# Patient Record
Sex: MALE | Race: WHITE
[De-identification: names, ages, dates, MRNs, and addresses within clinical notes are randomized per-mention and may not be internally consistent; named-entity substitution may affect disease eponyms.]

---

## 2021-10-01 ENCOUNTER — HOSPITAL ENCOUNTER (EMERGENCY)
Dept: HOSPITAL 63 - ER | Age: 21
LOS: 1 days | Discharge: HOME | End: 2021-10-02
Payer: COMMERCIAL

## 2021-10-01 VITALS — BODY MASS INDEX: 20.06 KG/M2 | HEIGHT: 71 IN | WEIGHT: 143.3 LBS

## 2021-10-01 DIAGNOSIS — J45.909: ICD-10-CM

## 2021-10-01 DIAGNOSIS — R07.89: Primary | ICD-10-CM

## 2021-10-01 PROCEDURE — 71045 X-RAY EXAM CHEST 1 VIEW: CPT

## 2021-10-01 PROCEDURE — 94640 AIRWAY INHALATION TREATMENT: CPT

## 2021-10-01 PROCEDURE — 93005 ELECTROCARDIOGRAM TRACING: CPT

## 2021-10-02 VITALS — DIASTOLIC BLOOD PRESSURE: 67 MMHG | SYSTOLIC BLOOD PRESSURE: 134 MMHG

## 2021-10-02 NOTE — PHYS DOC
Past History


Past Medical History:  Asthma


Past Surgical History:  No Surgical History





Adult General


Chief Complaint


Chief Complaint:  ASTHMA





HPI


HPI





Patient is a 21-year-old male presenting for left-sided chest pain.  Onset was 

several days ago.  Nothing known makes better or worse.  Pain described as left-

sided over left rib cage and focal with no radiation.  Timing of symptoms has 

been intermittent since onset.  He has no history of cardiac disease or passing 

out with activity, no family history of early cardiac disease.  Denies tobacco 

alcohol or illicit drug use.  Does admit recent bout with COVID-19.  Also has 

history of asthma, states it is usually well controlled with albuterol inhaler 

but did not significantly improve when he took a albuterol treatment at home 

prompting him to come in for evaluation





Review of Systems


Review of Systems


Fourteen body systems of review of systems have been reviewed. See HPI for 

pertinent positives and negative responses, other wise all other systems are 

negative, non-pertinent or non-contributory





Physical Exam


Physical Exam


Constitutional: Well developed, well nourished, no acute distress, non-toxic 

appearance. 


HENT: Normocephalic, atraumatic, bilateral external ears normal, oropharynx 

moist, no oral exudates, nose normal. 


Eyes: PERRLA, EOMI, conjunctiva normal, no discharge.  


Neck: Normal range of motion, no tenderness, supple, no stridor.  


Cardiovascular: Heart rate regular, sinus rhythm, no murmurs rubs or gallops


Lungs & Thorax:  Bilateral breath sounds clear to auscultation 


Abdomen: Bowel sounds normal, soft, no tenderness, no masses, no pulsatile 

masses.  Nonsurgical abdomen, no peritoneal signs


Skin: Warm, dry, no erythema, no rash.  


Back: No tenderness, no CVA tenderness.  


Extremities: No tenderness, no cyanosis, no clubbing, ROM intact, no edema.  


Neurologic: Alert and oriented X 3, grossly normal motor & sensory function, no 

focal deficits noted. 


Psychologic: Affect normal, judgement normal, mood normal.





Current Patient Data


Vital Signs





                                   Vital Signs








  Date Time  Temp Pulse Resp B/P (MAP) Pulse Ox O2 Delivery O2 Flow Rate FiO2


 


10/2/21 00:00 98.3 80 18 132/82 (99) 99 Room Air  











EKG


EKG


EKG ordered and interpreted by myself at 00 16 hours as sinus rhythm at 78 bpm, 

unremarkable intervals, right axis deviation, no obvious ischemic findings, no 

STEMI





Radiology/Procedures


Radiology/Procedures





EXAM: AP View of the chest





DATE: 10/2/2021 12:26 AM





INDICATION: Reason: shob / Spl. Instructions:  / History: 





COMPARISON: No Prior





FINDINGS:








The heart is not enlarged.





Mediastinal and hilar contours are normal.





No focal parenchymal airspace opacity.





No pleural effusion or pneumothorax.











IMPRESSION:


1.  No radiographic evidence for acute cardiopulmonary process.





Electronically signed by: Kevin Hallman MD (10/2/2021 1:03 AM) [facility]





Heart Score


C/O Chest Pain:  No


HEART Score for Chest Pain:  








HEART Score for Chest Pain Response (Comments) Value


 


History Slighlty/Non-Suspicious 0


 


ECG Normal 0


 


Age < 45 0


 


Risk Factors No Risk Factors 0


 


Total  0








Risk Factors:


Risk Factors:  DM, Current or recent (<one month) smoker, HTN, HLP, family 

history of CAD, obesity.


Risk Scores:


Risk Factors:  DM, Current or recent (<one month) smoker, HTN, HLP, family 

history of CAD, obesity.





Course & Med Decision Making


Course & Med Decision Making


Pertinent Labs and Imaging studies reviewed. (See chart for details)





[]





Dragon Disclaimer


Dragon Disclaimer


This electronic medical record was generated, in whole or in part, using a voice

 recognition dictation system.





PERC Rule for PE











PERC Rule for PE Response (Comments) Value


 


Age > 50: No 0


 


HR > 100: No 0


 


Sa02 on room air <95%: No 0


 


Unilateral leg swelling: No 0


 


Hemoptysis: No 0


 


Recent surgery or trauma: No 0


 


Prior PE or DVT: No 0


 


Hormone use: No 0


 


Total  0











Departure


Departure:


Impression:  


   Primary Impression:  


   Chest pain, unspecified


Disposition:  01 HOME / SELF CARE / HOMELESS


Condition:  STABLE





Additional Instructions:  


You were seen for chest pain.  Your workup did not show any acute abnormalities 

today, but does not indicate that you do not have underlying cardiovascular 

disease. You do need to follow up with your primary doctor and potentially a 

cardiologist for further evaluation and treatment. You should return to the ED 

if you develop worsening chest pain, shortness of breath, fever, abnormal 

sweating, leg swelling, or any other new or concerning symptoms.











ASHLEIGH LATHAM DO                  Oct 2, 2021 00:17

## 2021-10-02 NOTE — EKG
[facility] [address], [place] [address]

Test Date:    2021-10-02               Test Time:    00:09:30

Pat Name:     TON BROWN     Department:   

Patient ID:   [id number]           Room:          

Gender:       M                        Technician:   

:          [id number]               Requested By: ASHLEIGH LATHAM

Order Number: [id number]            Reading MD:   Golden Boyle

                                 Measurements

Intervals                              Axis          

Rate:         78                       P:            73

MI:           122                      QRS:          96

QRSD:         94                       T:            41

QT:           380                                    

QTc:          437                                    

                           Interpretive Statements

SINUS ARRHYTHMIA

LEFT ATRIAL ABNORMALITY

Electronically Signed On 10-7-2021 13:02:04 CDT by Golden Boyle

## 2021-10-02 NOTE — RAD
EXAM: AP View of the chest



DATE: 10/2/2021 12:26 AM



INDICATION: Reason: shob / Spl. Instructions:  / History: 



COMPARISON: No Prior



FINDINGS:





The heart is not enlarged.



Mediastinal and hilar contours are normal.



No focal parenchymal airspace opacity.



No pleural effusion or pneumothorax.







IMPRESSION:

1.  No radiographic evidence for acute cardiopulmonary process.



Electronically signed by: Kevin Hallman MD (10/2/2021 1:03 AM) [facility]